# Patient Record
Sex: FEMALE | Race: BLACK OR AFRICAN AMERICAN
[De-identification: names, ages, dates, MRNs, and addresses within clinical notes are randomized per-mention and may not be internally consistent; named-entity substitution may affect disease eponyms.]

---

## 2017-03-29 ENCOUNTER — HOSPITAL ENCOUNTER (OUTPATIENT)
Dept: HOSPITAL 19 - COL.PUL | Age: 72
End: 2017-03-29
Attending: INTERNAL MEDICINE
Payer: MEDICARE

## 2017-03-29 DIAGNOSIS — R05: Primary | ICD-10-CM

## 2017-05-22 ENCOUNTER — HOSPITAL ENCOUNTER (OUTPATIENT)
Dept: HOSPITAL 19 - SURG | Age: 72
Setting detail: OBSERVATION
LOS: 2 days | Discharge: HOME | End: 2017-05-24
Attending: UROLOGY | Admitting: UROLOGY
Payer: MEDICARE

## 2017-05-22 VITALS — TEMPERATURE: 98.3 F | HEART RATE: 62 BPM | SYSTOLIC BLOOD PRESSURE: 138 MMHG | DIASTOLIC BLOOD PRESSURE: 69 MMHG

## 2017-05-22 VITALS — HEART RATE: 64 BPM | TEMPERATURE: 97.9 F | SYSTOLIC BLOOD PRESSURE: 140 MMHG | DIASTOLIC BLOOD PRESSURE: 55 MMHG

## 2017-05-22 VITALS — HEIGHT: 64 IN | WEIGHT: 198.2 LBS | BODY MASS INDEX: 33.84 KG/M2

## 2017-05-22 VITALS — HEART RATE: 67 BPM | DIASTOLIC BLOOD PRESSURE: 58 MMHG | SYSTOLIC BLOOD PRESSURE: 146 MMHG | TEMPERATURE: 97.7 F

## 2017-05-22 DIAGNOSIS — E11.9: ICD-10-CM

## 2017-05-22 DIAGNOSIS — K21.9: ICD-10-CM

## 2017-05-22 DIAGNOSIS — I10: ICD-10-CM

## 2017-05-22 DIAGNOSIS — G47.33: ICD-10-CM

## 2017-05-22 DIAGNOSIS — I25.10: ICD-10-CM

## 2017-05-22 DIAGNOSIS — Z79.4: ICD-10-CM

## 2017-05-22 DIAGNOSIS — Z95.5: ICD-10-CM

## 2017-05-22 DIAGNOSIS — E78.00: ICD-10-CM

## 2017-05-22 DIAGNOSIS — N20.1: Primary | ICD-10-CM

## 2017-05-22 PROCEDURE — G0378 HOSPITAL OBSERVATION PER HR: HCPCS

## 2017-05-22 PROCEDURE — C1769 GUIDE WIRE: HCPCS

## 2017-05-22 PROCEDURE — G0379 DIRECT REFER HOSPITAL OBSERV: HCPCS

## 2017-05-22 PROCEDURE — C2617 STENT, NON-COR, TEM W/O DEL: HCPCS

## 2017-05-23 VITALS — HEART RATE: 60 BPM | DIASTOLIC BLOOD PRESSURE: 69 MMHG | SYSTOLIC BLOOD PRESSURE: 149 MMHG | TEMPERATURE: 97.9 F

## 2017-05-23 VITALS — HEART RATE: 65 BPM | DIASTOLIC BLOOD PRESSURE: 56 MMHG | SYSTOLIC BLOOD PRESSURE: 134 MMHG | TEMPERATURE: 98.3 F

## 2017-05-23 VITALS — HEART RATE: 57 BPM | SYSTOLIC BLOOD PRESSURE: 131 MMHG | TEMPERATURE: 98.5 F | DIASTOLIC BLOOD PRESSURE: 56 MMHG

## 2017-05-23 VITALS — HEART RATE: 64 BPM | DIASTOLIC BLOOD PRESSURE: 72 MMHG | SYSTOLIC BLOOD PRESSURE: 150 MMHG

## 2017-05-23 VITALS — DIASTOLIC BLOOD PRESSURE: 63 MMHG | HEART RATE: 60 BPM | TEMPERATURE: 98.3 F | SYSTOLIC BLOOD PRESSURE: 152 MMHG

## 2017-05-23 VITALS — SYSTOLIC BLOOD PRESSURE: 136 MMHG | HEART RATE: 65 BPM | DIASTOLIC BLOOD PRESSURE: 76 MMHG | TEMPERATURE: 97.8 F

## 2017-05-23 VITALS — DIASTOLIC BLOOD PRESSURE: 61 MMHG | TEMPERATURE: 98.1 F | HEART RATE: 56 BPM | SYSTOLIC BLOOD PRESSURE: 149 MMHG

## 2017-05-23 VITALS — DIASTOLIC BLOOD PRESSURE: 58 MMHG | SYSTOLIC BLOOD PRESSURE: 158 MMHG | HEART RATE: 66 BPM | TEMPERATURE: 98.2 F

## 2017-05-23 VITALS — DIASTOLIC BLOOD PRESSURE: 78 MMHG | SYSTOLIC BLOOD PRESSURE: 142 MMHG | HEART RATE: 72 BPM

## 2017-05-23 VITALS — DIASTOLIC BLOOD PRESSURE: 84 MMHG | SYSTOLIC BLOOD PRESSURE: 140 MMHG | TEMPERATURE: 98 F | HEART RATE: 70 BPM

## 2017-05-23 VITALS — TEMPERATURE: 98.1 F | SYSTOLIC BLOOD PRESSURE: 149 MMHG | DIASTOLIC BLOOD PRESSURE: 61 MMHG | HEART RATE: 56 BPM

## 2017-05-23 VITALS — HEART RATE: 56 BPM | DIASTOLIC BLOOD PRESSURE: 82 MMHG | SYSTOLIC BLOOD PRESSURE: 156 MMHG

## 2017-05-24 VITALS — DIASTOLIC BLOOD PRESSURE: 54 MMHG | SYSTOLIC BLOOD PRESSURE: 116 MMHG | HEART RATE: 60 BPM | TEMPERATURE: 98.5 F

## 2017-05-24 VITALS — SYSTOLIC BLOOD PRESSURE: 109 MMHG | TEMPERATURE: 98.5 F | HEART RATE: 55 BPM | DIASTOLIC BLOOD PRESSURE: 52 MMHG

## 2017-05-29 ENCOUNTER — HOSPITAL ENCOUNTER (EMERGENCY)
Dept: HOSPITAL 19 - COL.ER | Age: 72
Discharge: HOME | End: 2017-05-29
Payer: MEDICARE

## 2017-05-29 VITALS — BODY MASS INDEX: 34.06 KG/M2 | WEIGHT: 199.52 LBS | HEIGHT: 64.02 IN

## 2017-05-29 VITALS — SYSTOLIC BLOOD PRESSURE: 134 MMHG | DIASTOLIC BLOOD PRESSURE: 78 MMHG | HEART RATE: 63 BPM

## 2017-05-29 VITALS — TEMPERATURE: 98.6 F

## 2017-05-29 DIAGNOSIS — Z95.5: ICD-10-CM

## 2017-05-29 DIAGNOSIS — M19.90: ICD-10-CM

## 2017-05-29 DIAGNOSIS — Z79.84: ICD-10-CM

## 2017-05-29 DIAGNOSIS — I25.10: ICD-10-CM

## 2017-05-29 DIAGNOSIS — I10: ICD-10-CM

## 2017-05-29 DIAGNOSIS — Z90.710: ICD-10-CM

## 2017-05-29 DIAGNOSIS — Z96.0: ICD-10-CM

## 2017-05-29 DIAGNOSIS — R07.89: Primary | ICD-10-CM

## 2017-05-29 DIAGNOSIS — E11.9: ICD-10-CM

## 2017-05-29 DIAGNOSIS — E78.00: ICD-10-CM

## 2017-05-29 LAB
ADJUSTED CALCIUM: 9.2 MG/DL (ref 8.4–10.2)
ALBUMIN SERPL-MCNC: 3.7 GM/DL (ref 3.5–5)
ALP SERPL-CCNC: 87 U/L (ref 50–136)
ALT SERPL-CCNC: 21 U/L (ref 9–52)
ANION GAP SERPL CALC-SCNC: 14 MMOL/L (ref 7–16)
BASOPHILS # BLD: 0 10*3/UL (ref 0–0.2)
BASOPHILS NFR BLD AUTO: 0.3 % (ref 0–2)
BILIRUB SERPL-MCNC: 0.5 MG/DL (ref 0–1)
BNP SERPL-MCNC: 25 PG/ML (ref 0–125)
BUN SERPL-MCNC: 15 MG/DL (ref 7–17)
CALCIUM SERPL-MCNC: 9 MG/DL (ref 8.4–10.2)
CHLORIDE SERPL-SCNC: 104 MMOL/L (ref 98–107)
CO2 SERPL-SCNC: 27 MMOL/L (ref 22–30)
CREAT SERPL-SCNC: 0.79 MG/DL (ref 0.52–1.25)
EOSINOPHIL # BLD: 0.4 10*3/UL (ref 0–0.7)
EOSINOPHIL NFR BLD: 6 % (ref 0–4)
GLUCOSE SERPL-MCNC: 160 MG/DL (ref 74–106)
GRANULOCYTES # BLD AUTO: 58 % (ref 42.2–75.2)
HCT VFR BLD AUTO: 37.5 % (ref 37–47)
HGB BLD-MCNC: 11.7 G/DL (ref 12.5–16)
LIPASE SERPL-CCNC: 65 U/L (ref 23–300)
LYMPHOCYTES # BLD: 1.8 10*3/UL (ref 1.2–3.4)
LYMPHOCYTES NFR BLD: 29.2 % (ref 20–51)
MCH RBC QN AUTO: 26 PG (ref 27–31)
MCHC RBC AUTO-ENTMCNC: 31 G/DL (ref 33–37)
MCV RBC AUTO: 84 FL (ref 80–100)
MONOCYTES # BLD: 0.4 10*3/UL (ref 0.1–0.6)
MONOCYTES NFR BLD AUTO: 6.2 % (ref 1.7–9.3)
NEUTROPHILS # BLD: 3.6 10*3/UL (ref 1.4–6.5)
PH UR STRIP.AUTO: 6 [PH] (ref 5–8)
PLATELET # BLD AUTO: 235 K/MM3 (ref 130–400)
PMV BLD AUTO: 11.5 FL (ref 7.4–10.4)
POTASSIUM SERPL-SCNC: 3.8 MMOL/L (ref 3.4–5)
PROT SERPL-MCNC: 6.8 GM/DL (ref 6.4–8.2)
RBC # BLD AUTO: 4.45 M/MM3 (ref 4.1–5.3)
RBC # UR STRIP.AUTO: (no result) /UL
RBC # UR: >50 /HPF
SODIUM SERPL-SCNC: 145 MMOL/L (ref 137–145)
SP GR UR STRIP.AUTO: 1.01 (ref 1–1.03)
SQUAMOUS # URNS: (no result) /HPF
TROPONIN I SERPL-MCNC: < 0.012 NG/ML (ref 0–0.03)
UA DIPSTICK PNL UR STRIP.AUTO: (no result)
URINE LEUKOCYTE ESTERASE: (no result)
URN COLLECT METHOD CLASS: (no result)
WBC # BLD AUTO: 6.1 K/MM3 (ref 4.8–10.8)

## 2018-01-23 ENCOUNTER — HOSPITAL ENCOUNTER (OUTPATIENT)
Dept: HOSPITAL 19 - MEDICAL | Age: 73
Setting detail: OBSERVATION
LOS: 1 days | Discharge: HOME | End: 2018-01-24
Attending: INTERNAL MEDICINE | Admitting: INTERNAL MEDICINE
Payer: MEDICARE

## 2018-01-23 VITALS — DIASTOLIC BLOOD PRESSURE: 45 MMHG | HEART RATE: 72 BPM | SYSTOLIC BLOOD PRESSURE: 131 MMHG

## 2018-01-23 VITALS — SYSTOLIC BLOOD PRESSURE: 145 MMHG | TEMPERATURE: 97.9 F | DIASTOLIC BLOOD PRESSURE: 59 MMHG | HEART RATE: 54 BPM

## 2018-01-23 VITALS — TEMPERATURE: 98.3 F | HEART RATE: 70 BPM | SYSTOLIC BLOOD PRESSURE: 123 MMHG | DIASTOLIC BLOOD PRESSURE: 44 MMHG

## 2018-01-23 VITALS — WEIGHT: 199.74 LBS | HEIGHT: 64.02 IN | BODY MASS INDEX: 34.1 KG/M2

## 2018-01-23 DIAGNOSIS — R42: Primary | ICD-10-CM

## 2018-01-23 DIAGNOSIS — R07.89: ICD-10-CM

## 2018-01-23 DIAGNOSIS — I25.10: ICD-10-CM

## 2018-01-23 DIAGNOSIS — Z95.5: ICD-10-CM

## 2018-01-23 DIAGNOSIS — Z79.84: ICD-10-CM

## 2018-01-23 DIAGNOSIS — Z83.3: ICD-10-CM

## 2018-01-23 DIAGNOSIS — I10: ICD-10-CM

## 2018-01-23 DIAGNOSIS — Z90.710: ICD-10-CM

## 2018-01-23 DIAGNOSIS — E78.5: ICD-10-CM

## 2018-01-23 DIAGNOSIS — E11.9: ICD-10-CM

## 2018-01-23 DIAGNOSIS — Z82.49: ICD-10-CM

## 2018-01-23 DIAGNOSIS — Z80.0: ICD-10-CM

## 2018-01-23 DIAGNOSIS — Z79.01: ICD-10-CM

## 2018-01-23 DIAGNOSIS — G47.33: ICD-10-CM

## 2018-01-23 PROCEDURE — G0379 DIRECT REFER HOSPITAL OBSERV: HCPCS

## 2018-01-23 PROCEDURE — G0378 HOSPITAL OBSERVATION PER HR: HCPCS

## 2018-01-24 VITALS — HEART RATE: 72 BPM | SYSTOLIC BLOOD PRESSURE: 147 MMHG | DIASTOLIC BLOOD PRESSURE: 40 MMHG

## 2018-01-24 VITALS — TEMPERATURE: 97.9 F | DIASTOLIC BLOOD PRESSURE: 58 MMHG | SYSTOLIC BLOOD PRESSURE: 129 MMHG | HEART RATE: 75 BPM

## 2018-01-24 VITALS — DIASTOLIC BLOOD PRESSURE: 73 MMHG | SYSTOLIC BLOOD PRESSURE: 141 MMHG | HEART RATE: 90 BPM | TEMPERATURE: 98.9 F

## 2018-01-24 VITALS — HEART RATE: 82 BPM | DIASTOLIC BLOOD PRESSURE: 84 MMHG | SYSTOLIC BLOOD PRESSURE: 150 MMHG

## 2018-01-24 VITALS — HEART RATE: 75 BPM | DIASTOLIC BLOOD PRESSURE: 69 MMHG | SYSTOLIC BLOOD PRESSURE: 148 MMHG | TEMPERATURE: 98.4 F

## 2018-01-24 LAB
ALBUMIN SERPL-MCNC: 3.6 GM/DL (ref 3.5–5)
ALP SERPL-CCNC: 97 U/L (ref 50–136)
ALT SERPL-CCNC: 35 U/L (ref 9–52)
ANION GAP SERPL CALC-SCNC: 8 MMOL/L (ref 7–16)
AST SERPL-CCNC: 27 U/L (ref 15–37)
BASOPHILS # BLD: 0 10*3/UL (ref 0–0.2)
BASOPHILS NFR BLD AUTO: 0.3 % (ref 0–2)
BILIRUB SERPL-MCNC: 0.3 MG/DL (ref 0–1)
BUN SERPL-MCNC: 15 MG/DL (ref 7–17)
CALCIUM SERPL-MCNC: 8.9 MG/DL (ref 8.4–10.2)
CHLORIDE SERPL-SCNC: 106 MMOL/L (ref 98–107)
CO2 SERPL-SCNC: 27 MMOL/L (ref 22–30)
CREAT SERPL-SCNC: 0.81 MG/DL (ref 0.52–1.25)
EOSINOPHIL # BLD: 0.3 10*3/UL (ref 0–0.7)
EOSINOPHIL NFR BLD: 4.5 % (ref 0–4)
ERYTHROCYTE [DISTWIDTH] IN BLOOD BY AUTOMATED COUNT: 13 % (ref 11.5–14.5)
GLUCOSE SERPL-MCNC: 270 MG/DL (ref 74–106)
GRANULOCYTES # BLD AUTO: 47.2 % (ref 42.2–75.2)
HCT VFR BLD AUTO: 38.3 % (ref 37–47)
HGB BLD-MCNC: 11.8 G/DL (ref 12.5–16)
LYMPHOCYTES # BLD: 2.4 10*3/UL (ref 1.2–3.4)
LYMPHOCYTES NFR BLD: 40.4 % (ref 20–51)
MCH RBC QN AUTO: 26 PG (ref 27–31)
MCHC RBC AUTO-ENTMCNC: 31 G/DL (ref 33–37)
MCV RBC AUTO: 84 FL (ref 80–100)
MONOCYTES # BLD: 0.4 10*3/UL (ref 0.1–0.6)
MONOCYTES NFR BLD AUTO: 7.4 % (ref 1.7–9.3)
NEUTROPHILS # BLD: 2.8 10*3/UL (ref 1.4–6.5)
PLATELET # BLD AUTO: 186 K/MM3 (ref 130–400)
PMV BLD AUTO: 12.7 FL (ref 7.4–10.4)
POTASSIUM SERPL-SCNC: 3.7 MMOL/L (ref 3.4–5)
PROT SERPL-MCNC: 6.5 GM/DL (ref 6.4–8.2)
RBC # BLD AUTO: 4.55 M/MM3 (ref 4.1–5.3)
SODIUM SERPL-SCNC: 140 MMOL/L (ref 137–145)

## 2018-06-12 ENCOUNTER — HOSPITAL ENCOUNTER (INPATIENT)
Dept: HOSPITAL 19 - MEDICAL | Age: 73
LOS: 2 days | Discharge: HOME | DRG: 247 | End: 2018-06-14
Attending: FAMILY MEDICINE | Admitting: FAMILY MEDICINE
Payer: MEDICARE

## 2018-06-12 VITALS — HEART RATE: 59 BPM | SYSTOLIC BLOOD PRESSURE: 152 MMHG | DIASTOLIC BLOOD PRESSURE: 59 MMHG | TEMPERATURE: 98.7 F

## 2018-06-12 VITALS — TEMPERATURE: 97.2 F | HEART RATE: 55 BPM | DIASTOLIC BLOOD PRESSURE: 73 MMHG | SYSTOLIC BLOOD PRESSURE: 154 MMHG

## 2018-06-12 VITALS — HEIGHT: 64.02 IN | WEIGHT: 184.75 LBS | BODY MASS INDEX: 31.54 KG/M2

## 2018-06-12 VITALS — SYSTOLIC BLOOD PRESSURE: 144 MMHG | TEMPERATURE: 98.9 F | DIASTOLIC BLOOD PRESSURE: 51 MMHG | HEART RATE: 48 BPM

## 2018-06-12 VITALS — DIASTOLIC BLOOD PRESSURE: 45 MMHG | TEMPERATURE: 97.6 F | SYSTOLIC BLOOD PRESSURE: 138 MMHG | HEART RATE: 50 BPM

## 2018-06-12 VITALS — DIASTOLIC BLOOD PRESSURE: 53 MMHG | HEART RATE: 51 BPM | SYSTOLIC BLOOD PRESSURE: 129 MMHG | TEMPERATURE: 97.2 F

## 2018-06-12 VITALS — HEART RATE: 49 BPM | DIASTOLIC BLOOD PRESSURE: 56 MMHG | TEMPERATURE: 98.8 F | SYSTOLIC BLOOD PRESSURE: 138 MMHG

## 2018-06-12 VITALS — DIASTOLIC BLOOD PRESSURE: 73 MMHG | SYSTOLIC BLOOD PRESSURE: 154 MMHG | HEART RATE: 55 BPM | TEMPERATURE: 97.2 F

## 2018-06-12 DIAGNOSIS — Z79.4: ICD-10-CM

## 2018-06-12 DIAGNOSIS — E66.9: ICD-10-CM

## 2018-06-12 DIAGNOSIS — E11.9: ICD-10-CM

## 2018-06-12 DIAGNOSIS — E78.5: ICD-10-CM

## 2018-06-12 DIAGNOSIS — R00.1: ICD-10-CM

## 2018-06-12 DIAGNOSIS — I10: ICD-10-CM

## 2018-06-12 DIAGNOSIS — I25.110: Primary | ICD-10-CM

## 2018-06-12 DIAGNOSIS — Z79.84: ICD-10-CM

## 2018-06-12 DIAGNOSIS — G47.33: ICD-10-CM

## 2018-06-12 LAB
CHOLEST SPEC-SCNC: 169 MG/DL (ref 120–200)
CHOLEST/HDLC SERPL-SRTO: 4.3
HDLC SERPL-MCNC: 39 MG/DL
LDLC SERPL-MCNC: 118 MG/DL
MAGNESIUM SERPL-MCNC: 2.1 MG/DL (ref 1.6–2.3)
TRIGL SERPL-MCNC: 60 MG/DL
TROPONIN I SERPL-MCNC: < 0.012 NG/ML (ref 0–0.03)

## 2018-06-12 PROCEDURE — C1874 STENT, COATED/COV W/DEL SYS: HCPCS

## 2018-06-12 PROCEDURE — C1887 CATHETER, GUIDING: HCPCS

## 2018-06-12 PROCEDURE — C1769 GUIDE WIRE: HCPCS

## 2018-06-12 PROCEDURE — C9607 PERC D-E COR REVASC CHRO SIN: HCPCS

## 2018-06-12 PROCEDURE — G0378 HOSPITAL OBSERVATION PER HR: HCPCS

## 2018-06-12 PROCEDURE — C1725 CATH, TRANSLUMIN NON-LASER: HCPCS

## 2018-06-12 PROCEDURE — G0379 DIRECT REFER HOSPITAL OBSERV: HCPCS

## 2018-06-13 VITALS — OXYGEN SATURATION: 99 %

## 2018-06-13 VITALS — HEART RATE: 56 BPM | OXYGEN SATURATION: 100 % | DIASTOLIC BLOOD PRESSURE: 93 MMHG | SYSTOLIC BLOOD PRESSURE: 155 MMHG

## 2018-06-13 VITALS — OXYGEN SATURATION: 100 %

## 2018-06-13 VITALS — OXYGEN SATURATION: 97 %

## 2018-06-13 VITALS — OXYGEN SATURATION: 98 %

## 2018-06-13 VITALS — SYSTOLIC BLOOD PRESSURE: 106 MMHG | DIASTOLIC BLOOD PRESSURE: 52 MMHG | HEART RATE: 60 BPM | OXYGEN SATURATION: 92 %

## 2018-06-13 VITALS — DIASTOLIC BLOOD PRESSURE: 70 MMHG | HEART RATE: 65 BPM | SYSTOLIC BLOOD PRESSURE: 114 MMHG | TEMPERATURE: 97.8 F

## 2018-06-13 VITALS — SYSTOLIC BLOOD PRESSURE: 146 MMHG | OXYGEN SATURATION: 99 % | DIASTOLIC BLOOD PRESSURE: 83 MMHG | HEART RATE: 57 BPM

## 2018-06-13 VITALS — HEART RATE: 65 BPM | SYSTOLIC BLOOD PRESSURE: 129 MMHG | DIASTOLIC BLOOD PRESSURE: 69 MMHG

## 2018-06-13 VITALS — SYSTOLIC BLOOD PRESSURE: 139 MMHG | DIASTOLIC BLOOD PRESSURE: 95 MMHG | HEART RATE: 62 BPM

## 2018-06-13 VITALS — SYSTOLIC BLOOD PRESSURE: 151 MMHG | DIASTOLIC BLOOD PRESSURE: 92 MMHG | HEART RATE: 56 BPM

## 2018-06-13 VITALS
TEMPERATURE: 97.8 F | OXYGEN SATURATION: 99 % | HEART RATE: 57 BPM | SYSTOLIC BLOOD PRESSURE: 140 MMHG | DIASTOLIC BLOOD PRESSURE: 76 MMHG

## 2018-06-13 VITALS — DIASTOLIC BLOOD PRESSURE: 68 MMHG | TEMPERATURE: 98.2 F | SYSTOLIC BLOOD PRESSURE: 130 MMHG | HEART RATE: 73 BPM

## 2018-06-13 VITALS — DIASTOLIC BLOOD PRESSURE: 96 MMHG | SYSTOLIC BLOOD PRESSURE: 135 MMHG | HEART RATE: 65 BPM | OXYGEN SATURATION: 93 %

## 2018-06-13 VITALS — OXYGEN SATURATION: 92 %

## 2018-06-13 VITALS — DIASTOLIC BLOOD PRESSURE: 75 MMHG | SYSTOLIC BLOOD PRESSURE: 141 MMHG | HEART RATE: 65 BPM

## 2018-06-13 VITALS — SYSTOLIC BLOOD PRESSURE: 151 MMHG | HEART RATE: 56 BPM | DIASTOLIC BLOOD PRESSURE: 92 MMHG

## 2018-06-13 VITALS — DIASTOLIC BLOOD PRESSURE: 87 MMHG | HEART RATE: 72 BPM | SYSTOLIC BLOOD PRESSURE: 137 MMHG

## 2018-06-13 VITALS — OXYGEN SATURATION: 77 %

## 2018-06-13 VITALS — HEART RATE: 53 BPM | SYSTOLIC BLOOD PRESSURE: 142 MMHG | DIASTOLIC BLOOD PRESSURE: 60 MMHG

## 2018-06-13 VITALS — SYSTOLIC BLOOD PRESSURE: 137 MMHG | DIASTOLIC BLOOD PRESSURE: 88 MMHG | HEART RATE: 70 BPM

## 2018-06-13 VITALS — DIASTOLIC BLOOD PRESSURE: 88 MMHG | SYSTOLIC BLOOD PRESSURE: 150 MMHG | HEART RATE: 65 BPM

## 2018-06-13 VITALS — OXYGEN SATURATION: 93 %

## 2018-06-13 VITALS — OXYGEN SATURATION: 96 %

## 2018-06-13 VITALS — OXYGEN SATURATION: 94 %

## 2018-06-13 VITALS — SYSTOLIC BLOOD PRESSURE: 109 MMHG | DIASTOLIC BLOOD PRESSURE: 52 MMHG

## 2018-06-13 VITALS — OXYGEN SATURATION: 95 %

## 2018-06-13 VITALS — OXYGEN SATURATION: 91 %

## 2018-06-13 LAB
ANION GAP SERPL CALC-SCNC: 11 MMOL/L (ref 7–16)
BASOPHILS # BLD: 0 10*3/UL (ref 0–0.2)
BASOPHILS NFR BLD AUTO: 0.2 % (ref 0–2)
BUN SERPL-MCNC: 12 MG/DL (ref 7–17)
CALCIUM SERPL-MCNC: 9 MG/DL (ref 8.4–10.2)
CHLORIDE SERPL-SCNC: 105 MMOL/L (ref 98–107)
CHOLEST SPEC-SCNC: 178 MG/DL (ref 120–200)
CHOLEST/HDLC SERPL-SRTO: 4.6
CO2 SERPL-SCNC: 24 MMOL/L (ref 22–30)
CREAT SERPL-SCNC: 0.78 MG/DL (ref 0.52–1.25)
EOSINOPHIL # BLD: 0.3 10*3/UL (ref 0–0.7)
EOSINOPHIL NFR BLD: 6.7 % (ref 0–4)
ERYTHROCYTE [DISTWIDTH] IN BLOOD BY AUTOMATED COUNT: 13.7 % (ref 11.5–14.5)
GLUCOSE SERPL-MCNC: 112 MG/DL (ref 74–106)
GRANULOCYTES # BLD AUTO: 53.8 % (ref 42.2–75.2)
HCT VFR BLD AUTO: 40.8 % (ref 37–47)
HDLC SERPL-MCNC: 38 MG/DL
HGB BLD-MCNC: 12.7 G/DL (ref 12.5–16)
LDLC SERPL-MCNC: 125 MG/DL
LYMPHOCYTES # BLD: 1.6 10*3/UL (ref 1.2–3.4)
LYMPHOCYTES NFR BLD: 32.6 % (ref 20–51)
MCH RBC QN AUTO: 26 PG (ref 27–31)
MCHC RBC AUTO-ENTMCNC: 31 G/DL (ref 33–37)
MCV RBC AUTO: 84 FL (ref 80–100)
MONOCYTES # BLD: 0.3 10*3/UL (ref 0.1–0.6)
MONOCYTES NFR BLD AUTO: 6.7 % (ref 1.7–9.3)
NEUTROPHILS # BLD: 2.6 10*3/UL (ref 1.4–6.5)
PLATELET # BLD AUTO: 214 K/MM3 (ref 130–400)
PMV BLD AUTO: 12.1 FL (ref 7.4–10.4)
POTASSIUM SERPL-SCNC: 3.9 MMOL/L (ref 3.4–5)
RBC # BLD AUTO: 4.86 M/MM3 (ref 4.1–5.3)
SODIUM SERPL-SCNC: 140 MMOL/L (ref 137–145)
TRIGL SERPL-MCNC: 74 MG/DL
TROPONIN I SERPL-MCNC: < 0.012 NG/ML (ref 0–0.03)

## 2018-06-13 PROCEDURE — 4A023N7 MEASUREMENT OF CARDIAC SAMPLING AND PRESSURE, LEFT HEART, PERCUTANEOUS APPROACH: ICD-10-PCS | Performed by: INTERNAL MEDICINE

## 2018-06-13 PROCEDURE — B2111ZZ FLUOROSCOPY OF MULTIPLE CORONARY ARTERIES USING LOW OSMOLAR CONTRAST: ICD-10-PCS | Performed by: INTERNAL MEDICINE

## 2018-06-13 PROCEDURE — 027034Z DILATION OF CORONARY ARTERY, ONE ARTERY WITH DRUG-ELUTING INTRALUMINAL DEVICE, PERCUTANEOUS APPROACH: ICD-10-PCS | Performed by: INTERNAL MEDICINE

## 2018-06-13 PROCEDURE — B2151ZZ FLUOROSCOPY OF LEFT HEART USING LOW OSMOLAR CONTRAST: ICD-10-PCS | Performed by: INTERNAL MEDICINE

## 2018-06-14 VITALS — OXYGEN SATURATION: 100 %

## 2018-06-14 VITALS — OXYGEN SATURATION: 99 %

## 2018-06-14 VITALS
TEMPERATURE: 97.8 F | HEART RATE: 52 BPM | OXYGEN SATURATION: 98 % | SYSTOLIC BLOOD PRESSURE: 130 MMHG | DIASTOLIC BLOOD PRESSURE: 67 MMHG

## 2018-06-14 VITALS — OXYGEN SATURATION: 98 %

## 2018-06-14 VITALS — OXYGEN SATURATION: 96 %

## 2018-06-14 VITALS — OXYGEN SATURATION: 92 %

## 2018-06-14 VITALS — OXYGEN SATURATION: 97 %

## 2018-06-14 VITALS — TEMPERATURE: 97.4 F | HEART RATE: 59 BPM | SYSTOLIC BLOOD PRESSURE: 126 MMHG | DIASTOLIC BLOOD PRESSURE: 77 MMHG

## 2018-06-14 VITALS — HEART RATE: 62 BPM | DIASTOLIC BLOOD PRESSURE: 89 MMHG | SYSTOLIC BLOOD PRESSURE: 130 MMHG | TEMPERATURE: 98 F

## 2018-06-14 VITALS — OXYGEN SATURATION: 95 %

## 2018-06-14 VITALS — OXYGEN SATURATION: 88 %

## 2018-06-14 VITALS — OXYGEN SATURATION: 94 %

## 2018-06-14 LAB
ANION GAP SERPL CALC-SCNC: 8 MMOL/L (ref 7–16)
BASOPHILS # BLD: 0 10*3/UL (ref 0–0.2)
BASOPHILS NFR BLD AUTO: 0.4 % (ref 0–2)
BUN SERPL-MCNC: 12 MG/DL (ref 7–17)
CALCIUM SERPL-MCNC: 8.7 MG/DL (ref 8.4–10.2)
CHLORIDE SERPL-SCNC: 104 MMOL/L (ref 98–107)
CO2 SERPL-SCNC: 28 MMOL/L (ref 22–30)
CREAT SERPL-SCNC: 0.73 MG/DL (ref 0.52–1.25)
EOSINOPHIL # BLD: 0.3 10*3/UL (ref 0–0.7)
EOSINOPHIL NFR BLD: 5.2 % (ref 0–4)
ERYTHROCYTE [DISTWIDTH] IN BLOOD BY AUTOMATED COUNT: 13.6 % (ref 11.5–14.5)
GLUCOSE SERPL-MCNC: 115 MG/DL (ref 74–106)
GRANULOCYTES # BLD AUTO: 55.1 % (ref 42.2–75.2)
HCT VFR BLD AUTO: 39.5 % (ref 37–47)
HGB BLD-MCNC: 12.4 G/DL (ref 12.5–16)
LYMPHOCYTES # BLD: 1.7 10*3/UL (ref 1.2–3.4)
LYMPHOCYTES NFR BLD: 30.8 % (ref 20–51)
MCH RBC QN AUTO: 26 PG (ref 27–31)
MCHC RBC AUTO-ENTMCNC: 31 G/DL (ref 33–37)
MCV RBC AUTO: 83 FL (ref 80–100)
MONOCYTES # BLD: 0.5 10*3/UL (ref 0.1–0.6)
MONOCYTES NFR BLD AUTO: 8.3 % (ref 1.7–9.3)
NEUTROPHILS # BLD: 3 10*3/UL (ref 1.4–6.5)
PLATELET # BLD AUTO: 196 K/MM3 (ref 130–400)
PMV BLD AUTO: 11.9 FL (ref 7.4–10.4)
POTASSIUM SERPL-SCNC: 3.8 MMOL/L (ref 3.4–5)
RBC # BLD AUTO: 4.74 M/MM3 (ref 4.1–5.3)
SODIUM SERPL-SCNC: 140 MMOL/L (ref 137–145)

## 2018-06-18 ENCOUNTER — HOSPITAL ENCOUNTER (INPATIENT)
Dept: HOSPITAL 19 - COL.ER | Age: 73
LOS: 3 days | Discharge: HOME | DRG: 313 | End: 2018-06-21
Attending: INTERNAL MEDICINE | Admitting: INTERNAL MEDICINE
Payer: MEDICARE

## 2018-06-18 VITALS — OXYGEN SATURATION: 95 %

## 2018-06-18 VITALS — OXYGEN SATURATION: 96 %

## 2018-06-18 VITALS — HEART RATE: 52 BPM | TEMPERATURE: 97.5 F | DIASTOLIC BLOOD PRESSURE: 79 MMHG | SYSTOLIC BLOOD PRESSURE: 148 MMHG

## 2018-06-18 VITALS — OXYGEN SATURATION: 94 %

## 2018-06-18 VITALS — OXYGEN SATURATION: 98 %

## 2018-06-18 VITALS — HEIGHT: 64 IN | BODY MASS INDEX: 31.2 KG/M2 | WEIGHT: 182.76 LBS

## 2018-06-18 VITALS — OXYGEN SATURATION: 97 %

## 2018-06-18 VITALS — OXYGEN SATURATION: 99 %

## 2018-06-18 VITALS — OXYGEN SATURATION: 93 %

## 2018-06-18 VITALS — OXYGEN SATURATION: 92 %

## 2018-06-18 DIAGNOSIS — R20.2: ICD-10-CM

## 2018-06-18 DIAGNOSIS — I25.10: ICD-10-CM

## 2018-06-18 DIAGNOSIS — Z79.4: ICD-10-CM

## 2018-06-18 DIAGNOSIS — E11.9: ICD-10-CM

## 2018-06-18 DIAGNOSIS — G47.33: ICD-10-CM

## 2018-06-18 DIAGNOSIS — Z95.5: ICD-10-CM

## 2018-06-18 DIAGNOSIS — I10: ICD-10-CM

## 2018-06-18 DIAGNOSIS — R07.89: Primary | ICD-10-CM

## 2018-06-18 LAB
ALBUMIN SERPL-MCNC: 3.6 GM/DL (ref 3.5–5)
ALP SERPL-CCNC: 66 U/L (ref 50–136)
ALT SERPL-CCNC: 23 U/L (ref 9–52)
ANION GAP SERPL CALC-SCNC: 10 MMOL/L (ref 7–16)
APTT PPP: 36 SECONDS (ref 26–37)
AST SERPL-CCNC: 18 U/L (ref 15–37)
BASOPHILS # BLD: 0 10*3/UL (ref 0–0.2)
BASOPHILS NFR BLD AUTO: 0.3 % (ref 0–2)
BILIRUB SERPL-MCNC: 0.4 MG/DL (ref 0–1)
BUN SERPL-MCNC: 21 MG/DL (ref 7–17)
CALCIUM SERPL-MCNC: 9.3 MG/DL (ref 8.4–10.2)
CHLORIDE SERPL-SCNC: 105 MMOL/L (ref 98–107)
CO2 SERPL-SCNC: 27 MMOL/L (ref 22–30)
CREAT SERPL-SCNC: 0.98 MG/DL (ref 0.52–1.25)
CRP SERPL-MCNC: 0.9 MG/DL (ref 0–0.9)
EOSINOPHIL # BLD: 0.3 10*3/UL (ref 0–0.7)
EOSINOPHIL NFR BLD: 4.4 % (ref 0–4)
ERYTHROCYTE [DISTWIDTH] IN BLOOD BY AUTOMATED COUNT: 14 % (ref 11.5–14.5)
GLUCOSE SERPL-MCNC: 120 MG/DL (ref 74–106)
GRANULOCYTES # BLD AUTO: 58.5 % (ref 42.2–75.2)
HCT VFR BLD AUTO: 38 % (ref 37–47)
HGB BLD-MCNC: 12.1 G/DL (ref 12.5–16)
INR BLD: 1 (ref 0.8–3)
LIPASE SERPL-CCNC: 36 U/L (ref 23–300)
LYMPHOCYTES # BLD: 2 10*3/UL (ref 1.2–3.4)
LYMPHOCYTES NFR BLD: 28.7 % (ref 20–51)
MCH RBC QN AUTO: 26 PG (ref 27–31)
MCHC RBC AUTO-ENTMCNC: 32 G/DL (ref 33–37)
MCV RBC AUTO: 83 FL (ref 80–100)
MONOCYTES # BLD: 0.5 10*3/UL (ref 0.1–0.6)
MONOCYTES NFR BLD AUTO: 7.7 % (ref 1.7–9.3)
NEUTROPHILS # BLD: 4.1 10*3/UL (ref 1.4–6.5)
PLATELET # BLD AUTO: 207 K/MM3 (ref 130–400)
PMV BLD AUTO: 12.5 FL (ref 7.4–10.4)
POTASSIUM SERPL-SCNC: 3.8 MMOL/L (ref 3.4–5)
PROT SERPL-MCNC: 7.4 GM/DL (ref 6.4–8.2)
PROTHROMBIN TIME: 11.8 SECONDS (ref 9.7–12.8)
RBC # BLD AUTO: 4.58 M/MM3 (ref 4.1–5.3)
SODIUM SERPL-SCNC: 142 MMOL/L (ref 137–145)
TROPONIN I SERPL-MCNC: 0.05 NG/ML (ref 0–0.03)

## 2018-06-18 PROCEDURE — A9502 TC99M TETROFOSMIN: HCPCS

## 2018-06-18 PROCEDURE — A9585 GADOBUTROL INJECTION: HCPCS

## 2018-06-18 PROCEDURE — G0378 HOSPITAL OBSERVATION PER HR: HCPCS

## 2018-06-19 VITALS — OXYGEN SATURATION: 95 %

## 2018-06-19 VITALS — OXYGEN SATURATION: 96 %

## 2018-06-19 VITALS — OXYGEN SATURATION: 97 %

## 2018-06-19 VITALS — SYSTOLIC BLOOD PRESSURE: 103 MMHG | HEART RATE: 83 BPM | DIASTOLIC BLOOD PRESSURE: 52 MMHG

## 2018-06-19 VITALS — OXYGEN SATURATION: 94 %

## 2018-06-19 VITALS — SYSTOLIC BLOOD PRESSURE: 131 MMHG | DIASTOLIC BLOOD PRESSURE: 64 MMHG | HEART RATE: 51 BPM

## 2018-06-19 VITALS — OXYGEN SATURATION: 93 %

## 2018-06-19 VITALS — OXYGEN SATURATION: 91 %

## 2018-06-19 VITALS — DIASTOLIC BLOOD PRESSURE: 43 MMHG | HEART RATE: 82 BPM | SYSTOLIC BLOOD PRESSURE: 92 MMHG

## 2018-06-19 VITALS — SYSTOLIC BLOOD PRESSURE: 98 MMHG | DIASTOLIC BLOOD PRESSURE: 41 MMHG | HEART RATE: 78 BPM

## 2018-06-19 VITALS — OXYGEN SATURATION: 92 %

## 2018-06-19 VITALS — OXYGEN SATURATION: 99 %

## 2018-06-19 VITALS — OXYGEN SATURATION: 100 %

## 2018-06-19 VITALS
HEART RATE: 53 BPM | OXYGEN SATURATION: 98 % | SYSTOLIC BLOOD PRESSURE: 145 MMHG | TEMPERATURE: 98 F | DIASTOLIC BLOOD PRESSURE: 73 MMHG

## 2018-06-19 VITALS — OXYGEN SATURATION: 98 %

## 2018-06-19 VITALS — OXYGEN SATURATION: 89 %

## 2018-06-19 VITALS — HEART RATE: 56 BPM | SYSTOLIC BLOOD PRESSURE: 111 MMHG | DIASTOLIC BLOOD PRESSURE: 56 MMHG

## 2018-06-19 VITALS — DIASTOLIC BLOOD PRESSURE: 32 MMHG | HEART RATE: 91 BPM | SYSTOLIC BLOOD PRESSURE: 83 MMHG

## 2018-06-19 VITALS — TEMPERATURE: 97.8 F | DIASTOLIC BLOOD PRESSURE: 79 MMHG | SYSTOLIC BLOOD PRESSURE: 153 MMHG | HEART RATE: 47 BPM

## 2018-06-19 VITALS — TEMPERATURE: 97.6 F | SYSTOLIC BLOOD PRESSURE: 135 MMHG | HEART RATE: 48 BPM | DIASTOLIC BLOOD PRESSURE: 62 MMHG

## 2018-06-19 VITALS — SYSTOLIC BLOOD PRESSURE: 133 MMHG | DIASTOLIC BLOOD PRESSURE: 67 MMHG | HEART RATE: 61 BPM | TEMPERATURE: 97.5 F

## 2018-06-19 VITALS — OXYGEN SATURATION: 88 %

## 2018-06-19 VITALS — DIASTOLIC BLOOD PRESSURE: 64 MMHG | TEMPERATURE: 97.6 F | HEART RATE: 51 BPM | SYSTOLIC BLOOD PRESSURE: 131 MMHG

## 2018-06-19 VITALS — OXYGEN SATURATION: 90 %

## 2018-06-19 LAB
ANION GAP SERPL CALC-SCNC: 8 MMOL/L (ref 7–16)
BASOPHILS # BLD: 0 10*3/UL (ref 0–0.2)
BASOPHILS NFR BLD AUTO: 0.4 % (ref 0–2)
BUN SERPL-MCNC: 20 MG/DL (ref 7–17)
CALCIUM SERPL-MCNC: 9 MG/DL (ref 8.4–10.2)
CHLORIDE SERPL-SCNC: 106 MMOL/L (ref 98–107)
CO2 SERPL-SCNC: 29 MMOL/L (ref 22–30)
CREAT SERPL-SCNC: 0.9 MG/DL (ref 0.52–1.25)
EOSINOPHIL # BLD: 0.3 10*3/UL (ref 0–0.7)
EOSINOPHIL NFR BLD: 6 % (ref 0–4)
ERYTHROCYTE [DISTWIDTH] IN BLOOD BY AUTOMATED COUNT: 14 % (ref 11.5–14.5)
GLUCOSE SERPL-MCNC: 100 MG/DL (ref 74–106)
GRANULOCYTES # BLD AUTO: 48.2 % (ref 42.2–75.2)
HCT VFR BLD AUTO: 38 % (ref 37–47)
HGB BLD-MCNC: 11.9 G/DL (ref 12.5–16)
LYMPHOCYTES # BLD: 2.1 10*3/UL (ref 1.2–3.4)
LYMPHOCYTES NFR BLD: 36.2 % (ref 20–51)
MCH RBC QN AUTO: 26 PG (ref 27–31)
MCHC RBC AUTO-ENTMCNC: 31 G/DL (ref 33–37)
MCV RBC AUTO: 83 FL (ref 80–100)
MONOCYTES # BLD: 0.5 10*3/UL (ref 0.1–0.6)
MONOCYTES NFR BLD AUTO: 9 % (ref 1.7–9.3)
NEUTROPHILS # BLD: 2.7 10*3/UL (ref 1.4–6.5)
PLATELET # BLD AUTO: 195 K/MM3 (ref 130–400)
PMV BLD AUTO: 12.3 FL (ref 7.4–10.4)
POTASSIUM SERPL-SCNC: 3.8 MMOL/L (ref 3.4–5)
RBC # BLD AUTO: 4.56 M/MM3 (ref 4.1–5.3)
SODIUM SERPL-SCNC: 143 MMOL/L (ref 137–145)
TROPONIN I SERPL-MCNC: 0.05 NG/ML (ref 0–0.03)

## 2018-06-20 VITALS — SYSTOLIC BLOOD PRESSURE: 165 MMHG | DIASTOLIC BLOOD PRESSURE: 63 MMHG | HEART RATE: 70 BPM | TEMPERATURE: 98.8 F

## 2018-06-20 VITALS — DIASTOLIC BLOOD PRESSURE: 54 MMHG | SYSTOLIC BLOOD PRESSURE: 140 MMHG | TEMPERATURE: 98 F | HEART RATE: 68 BPM

## 2018-06-20 VITALS — SYSTOLIC BLOOD PRESSURE: 136 MMHG | TEMPERATURE: 98.4 F | DIASTOLIC BLOOD PRESSURE: 63 MMHG | HEART RATE: 50 BPM

## 2018-06-20 VITALS — SYSTOLIC BLOOD PRESSURE: 136 MMHG | TEMPERATURE: 98.6 F | HEART RATE: 54 BPM | DIASTOLIC BLOOD PRESSURE: 60 MMHG

## 2018-06-20 VITALS — TEMPERATURE: 98.8 F | DIASTOLIC BLOOD PRESSURE: 53 MMHG | HEART RATE: 56 BPM | SYSTOLIC BLOOD PRESSURE: 131 MMHG

## 2018-06-20 VITALS — TEMPERATURE: 98.7 F | SYSTOLIC BLOOD PRESSURE: 104 MMHG | HEART RATE: 62 BPM | DIASTOLIC BLOOD PRESSURE: 64 MMHG

## 2018-06-21 VITALS — DIASTOLIC BLOOD PRESSURE: 75 MMHG | TEMPERATURE: 97.6 F | SYSTOLIC BLOOD PRESSURE: 150 MMHG | HEART RATE: 66 BPM

## 2018-06-21 VITALS — TEMPERATURE: 97.5 F | DIASTOLIC BLOOD PRESSURE: 49 MMHG | HEART RATE: 60 BPM | SYSTOLIC BLOOD PRESSURE: 146 MMHG

## 2018-06-21 VITALS — DIASTOLIC BLOOD PRESSURE: 63 MMHG | SYSTOLIC BLOOD PRESSURE: 143 MMHG | TEMPERATURE: 98 F | HEART RATE: 55 BPM

## 2018-11-13 ENCOUNTER — HOSPITAL ENCOUNTER (INPATIENT)
Dept: HOSPITAL 19 - MEDICAL | Age: 73
LOS: 2 days | Discharge: HOME | DRG: 287 | End: 2018-11-15
Attending: INTERNAL MEDICINE | Admitting: INTERNAL MEDICINE
Payer: MEDICARE

## 2018-11-13 VITALS — WEIGHT: 201.06 LBS | BODY MASS INDEX: 34.33 KG/M2 | HEIGHT: 64.02 IN

## 2018-11-13 VITALS — HEART RATE: 65 BPM | SYSTOLIC BLOOD PRESSURE: 133 MMHG | DIASTOLIC BLOOD PRESSURE: 57 MMHG | TEMPERATURE: 98.2 F

## 2018-11-13 VITALS — HEART RATE: 67 BPM | DIASTOLIC BLOOD PRESSURE: 65 MMHG | SYSTOLIC BLOOD PRESSURE: 155 MMHG | TEMPERATURE: 97.6 F

## 2018-11-13 VITALS — SYSTOLIC BLOOD PRESSURE: 156 MMHG | DIASTOLIC BLOOD PRESSURE: 67 MMHG | TEMPERATURE: 97.4 F | HEART RATE: 65 BPM

## 2018-11-13 VITALS — TEMPERATURE: 97.7 F | SYSTOLIC BLOOD PRESSURE: 146 MMHG | DIASTOLIC BLOOD PRESSURE: 61 MMHG | HEART RATE: 57 BPM

## 2018-11-13 DIAGNOSIS — I25.10: ICD-10-CM

## 2018-11-13 DIAGNOSIS — R07.89: Primary | ICD-10-CM

## 2018-11-13 DIAGNOSIS — Z95.5: ICD-10-CM

## 2018-11-13 DIAGNOSIS — E78.5: ICD-10-CM

## 2018-11-13 DIAGNOSIS — Z23: ICD-10-CM

## 2018-11-13 DIAGNOSIS — Z79.4: ICD-10-CM

## 2018-11-13 DIAGNOSIS — I10: ICD-10-CM

## 2018-11-13 DIAGNOSIS — G47.33: ICD-10-CM

## 2018-11-13 DIAGNOSIS — E11.9: ICD-10-CM

## 2018-11-13 LAB — TROPONIN I SERPL-MCNC: < 0.012 NG/ML (ref 0–0.03)

## 2018-11-13 PROCEDURE — C1887 CATHETER, GUIDING: HCPCS

## 2018-11-13 PROCEDURE — C1769 GUIDE WIRE: HCPCS

## 2018-11-13 PROCEDURE — G0378 HOSPITAL OBSERVATION PER HR: HCPCS

## 2018-11-13 PROCEDURE — C9113 INJ PANTOPRAZOLE SODIUM, VIA: HCPCS

## 2018-11-14 VITALS — TEMPERATURE: 98 F | HEART RATE: 65 BPM | SYSTOLIC BLOOD PRESSURE: 149 MMHG | DIASTOLIC BLOOD PRESSURE: 72 MMHG

## 2018-11-14 VITALS — SYSTOLIC BLOOD PRESSURE: 124 MMHG | DIASTOLIC BLOOD PRESSURE: 64 MMHG | HEART RATE: 57 BPM

## 2018-11-14 VITALS — DIASTOLIC BLOOD PRESSURE: 53 MMHG | SYSTOLIC BLOOD PRESSURE: 148 MMHG | HEART RATE: 95 BPM

## 2018-11-14 VITALS — DIASTOLIC BLOOD PRESSURE: 61 MMHG | HEART RATE: 78 BPM | SYSTOLIC BLOOD PRESSURE: 137 MMHG

## 2018-11-14 VITALS — DIASTOLIC BLOOD PRESSURE: 58 MMHG | SYSTOLIC BLOOD PRESSURE: 128 MMHG | HEART RATE: 56 BPM

## 2018-11-14 VITALS — TEMPERATURE: 98.7 F | SYSTOLIC BLOOD PRESSURE: 165 MMHG | DIASTOLIC BLOOD PRESSURE: 74 MMHG | HEART RATE: 67 BPM

## 2018-11-14 VITALS — SYSTOLIC BLOOD PRESSURE: 153 MMHG | TEMPERATURE: 97.7 F | DIASTOLIC BLOOD PRESSURE: 72 MMHG | HEART RATE: 61 BPM

## 2018-11-14 VITALS — SYSTOLIC BLOOD PRESSURE: 165 MMHG | HEART RATE: 67 BPM | DIASTOLIC BLOOD PRESSURE: 74 MMHG | TEMPERATURE: 98.7 F

## 2018-11-14 VITALS — SYSTOLIC BLOOD PRESSURE: 131 MMHG | TEMPERATURE: 97.9 F | DIASTOLIC BLOOD PRESSURE: 69 MMHG | HEART RATE: 98 BPM

## 2018-11-14 VITALS — HEART RATE: 101 BPM | SYSTOLIC BLOOD PRESSURE: 128 MMHG | DIASTOLIC BLOOD PRESSURE: 58 MMHG

## 2018-11-14 VITALS — DIASTOLIC BLOOD PRESSURE: 63 MMHG | HEART RATE: 96 BPM | SYSTOLIC BLOOD PRESSURE: 143 MMHG

## 2018-11-14 VITALS — SYSTOLIC BLOOD PRESSURE: 136 MMHG | DIASTOLIC BLOOD PRESSURE: 55 MMHG

## 2018-11-14 VITALS — DIASTOLIC BLOOD PRESSURE: 73 MMHG | SYSTOLIC BLOOD PRESSURE: 152 MMHG

## 2018-11-14 VITALS — HEART RATE: 73 BPM | DIASTOLIC BLOOD PRESSURE: 90 MMHG | SYSTOLIC BLOOD PRESSURE: 116 MMHG

## 2018-11-14 VITALS — TEMPERATURE: 98.7 F | SYSTOLIC BLOOD PRESSURE: 165 MMHG | HEART RATE: 67 BPM | DIASTOLIC BLOOD PRESSURE: 74 MMHG

## 2018-11-14 VITALS — HEART RATE: 61 BPM | SYSTOLIC BLOOD PRESSURE: 143 MMHG | DIASTOLIC BLOOD PRESSURE: 63 MMHG

## 2018-11-14 VITALS — HEART RATE: 95 BPM | DIASTOLIC BLOOD PRESSURE: 90 MMHG | SYSTOLIC BLOOD PRESSURE: 116 MMHG

## 2018-11-14 VITALS — HEART RATE: 59 BPM | DIASTOLIC BLOOD PRESSURE: 59 MMHG | SYSTOLIC BLOOD PRESSURE: 136 MMHG

## 2018-11-14 VITALS — SYSTOLIC BLOOD PRESSURE: 126 MMHG | DIASTOLIC BLOOD PRESSURE: 63 MMHG | HEART RATE: 50 BPM

## 2018-11-14 LAB
ALBUMIN SERPL-MCNC: 3.1 GM/DL (ref 3.5–5)
ALP SERPL-CCNC: 59 U/L (ref 50–136)
ALT SERPL-CCNC: 27 U/L (ref 9–52)
ANION GAP SERPL CALC-SCNC: 3 MMOL/L (ref 7–16)
AST SERPL-CCNC: 23 U/L (ref 15–37)
BASOPHILS # BLD: 0 10*3/UL (ref 0–0.2)
BASOPHILS NFR BLD AUTO: 0.2 % (ref 0–2)
BILIRUB SERPL-MCNC: 0.2 MG/DL (ref 0–1)
BUN SERPL-MCNC: 15 MG/DL (ref 7–17)
CALCIUM SERPL-MCNC: 8.3 MG/DL (ref 8.4–10.2)
CHLORIDE SERPL-SCNC: 112 MMOL/L (ref 98–107)
CHOLEST SPEC-SCNC: 131 MG/DL (ref 120–200)
CHOLEST/HDLC SERPL-SRTO: 3.1
CO2 SERPL-SCNC: 29 MMOL/L (ref 22–30)
CREAT SERPL-SCNC: 0.64 MG/DL (ref 0.52–1.25)
EOSINOPHIL # BLD: 0.2 10*3/UL (ref 0–0.7)
EOSINOPHIL NFR BLD: 4.4 % (ref 0–4)
ERYTHROCYTE [DISTWIDTH] IN BLOOD BY AUTOMATED COUNT: 14.8 % (ref 11.5–14.5)
GLUCOSE SERPL-MCNC: 105 MG/DL (ref 74–106)
GRANULOCYTES # BLD AUTO: 43.5 % (ref 42.2–75.2)
HCT VFR BLD AUTO: 36.1 % (ref 37–47)
HDLC SERPL-MCNC: 41 MG/DL
HGB BLD-MCNC: 11 G/DL (ref 12.5–16)
LDLC SERPL-MCNC: 78 MG/DL
LYMPHOCYTES # BLD: 2.1 10*3/UL (ref 1.2–3.4)
LYMPHOCYTES NFR BLD: 42.9 % (ref 20–51)
MCH RBC QN AUTO: 26 PG (ref 27–31)
MCHC RBC AUTO-ENTMCNC: 31 G/DL (ref 33–37)
MCV RBC AUTO: 84 FL (ref 80–100)
MONOCYTES # BLD: 0.4 10*3/UL (ref 0.1–0.6)
MONOCYTES NFR BLD AUTO: 8.8 % (ref 1.7–9.3)
NEUTROPHILS # BLD: 2.1 10*3/UL (ref 1.4–6.5)
PLATELET # BLD AUTO: 177 K/MM3 (ref 130–400)
PMV BLD AUTO: 12.1 FL (ref 7.4–10.4)
POTASSIUM SERPL-SCNC: 4 MMOL/L (ref 3.4–5)
PROT SERPL-MCNC: 6 GM/DL (ref 6.4–8.2)
RBC # BLD AUTO: 4.28 M/MM3 (ref 4.1–5.3)
SODIUM SERPL-SCNC: 144 MMOL/L (ref 137–145)
TRIGL SERPL-MCNC: 60 MG/DL
TROPONIN I SERPL-MCNC: < 0.012 NG/ML (ref 0–0.03)

## 2018-11-14 PROCEDURE — B2151ZZ FLUOROSCOPY OF LEFT HEART USING LOW OSMOLAR CONTRAST: ICD-10-PCS | Performed by: INTERNAL MEDICINE

## 2018-11-14 PROCEDURE — B31H1ZZ FLUOROSCOPY OF RIGHT UPPER EXTREMITY ARTERIES USING LOW OSMOLAR CONTRAST: ICD-10-PCS | Performed by: INTERNAL MEDICINE

## 2018-11-14 PROCEDURE — 4A023N7 MEASUREMENT OF CARDIAC SAMPLING AND PRESSURE, LEFT HEART, PERCUTANEOUS APPROACH: ICD-10-PCS | Performed by: INTERNAL MEDICINE

## 2018-11-14 PROCEDURE — B2111ZZ FLUOROSCOPY OF MULTIPLE CORONARY ARTERIES USING LOW OSMOLAR CONTRAST: ICD-10-PCS | Performed by: INTERNAL MEDICINE

## 2018-11-15 VITALS — TEMPERATURE: 98.7 F | DIASTOLIC BLOOD PRESSURE: 74 MMHG | SYSTOLIC BLOOD PRESSURE: 179 MMHG | HEART RATE: 63 BPM

## 2018-11-15 VITALS — TEMPERATURE: 98.6 F | DIASTOLIC BLOOD PRESSURE: 55 MMHG | SYSTOLIC BLOOD PRESSURE: 120 MMHG | HEART RATE: 68 BPM

## 2018-11-15 VITALS — HEART RATE: 77 BPM | SYSTOLIC BLOOD PRESSURE: 145 MMHG | DIASTOLIC BLOOD PRESSURE: 63 MMHG

## 2018-11-15 LAB
ANION GAP SERPL CALC-SCNC: 1 MMOL/L (ref 7–16)
BASOPHILS # BLD: 0 10*3/UL (ref 0–0.2)
BASOPHILS NFR BLD AUTO: 0.4 % (ref 0–2)
BUN SERPL-MCNC: 11 MG/DL (ref 7–17)
CALCIUM SERPL-MCNC: 8.6 MG/DL (ref 8.4–10.2)
CHLORIDE SERPL-SCNC: 109 MMOL/L (ref 98–107)
CO2 SERPL-SCNC: 31 MMOL/L (ref 22–30)
CREAT SERPL-SCNC: 0.64 MG/DL (ref 0.52–1.25)
EOSINOPHIL # BLD: 0.3 10*3/UL (ref 0–0.7)
EOSINOPHIL NFR BLD: 4.7 % (ref 0–4)
ERYTHROCYTE [DISTWIDTH] IN BLOOD BY AUTOMATED COUNT: 14.7 % (ref 11.5–14.5)
GLUCOSE SERPL-MCNC: 104 MG/DL (ref 74–106)
GRANULOCYTES # BLD AUTO: 57.3 % (ref 42.2–75.2)
HCT VFR BLD AUTO: 36.9 % (ref 37–47)
HGB BLD-MCNC: 11.7 G/DL (ref 12.5–16)
LYMPHOCYTES # BLD: 1.7 10*3/UL (ref 1.2–3.4)
LYMPHOCYTES NFR BLD: 30.4 % (ref 20–51)
MCH RBC QN AUTO: 26 PG (ref 27–31)
MCHC RBC AUTO-ENTMCNC: 32 G/DL (ref 33–37)
MCV RBC AUTO: 83 FL (ref 80–100)
MONOCYTES # BLD: 0.4 10*3/UL (ref 0.1–0.6)
MONOCYTES NFR BLD AUTO: 7 % (ref 1.7–9.3)
NEUTROPHILS # BLD: 3.3 10*3/UL (ref 1.4–6.5)
PLATELET # BLD AUTO: 193 K/MM3 (ref 130–400)
PMV BLD AUTO: 11.7 FL (ref 7.4–10.4)
POTASSIUM SERPL-SCNC: 3.8 MMOL/L (ref 3.4–5)
RBC # BLD AUTO: 4.45 M/MM3 (ref 4.1–5.3)
SODIUM SERPL-SCNC: 141 MMOL/L (ref 137–145)

## 2023-02-07 ENCOUNTER — HOSPITAL ENCOUNTER (OUTPATIENT)
Dept: HOSPITAL 19 - MEDICAL | Age: 78
Setting detail: OBSERVATION
LOS: 2 days | Discharge: HOME | End: 2023-02-09
Attending: HOSPITALIST | Admitting: HOSPITALIST
Payer: MEDICARE

## 2023-02-07 VITALS — TEMPERATURE: 97.9 F | SYSTOLIC BLOOD PRESSURE: 99 MMHG | HEART RATE: 64 BPM | DIASTOLIC BLOOD PRESSURE: 45 MMHG

## 2023-02-07 VITALS — HEART RATE: 56 BPM | DIASTOLIC BLOOD PRESSURE: 51 MMHG | TEMPERATURE: 97.5 F | SYSTOLIC BLOOD PRESSURE: 149 MMHG

## 2023-02-07 VITALS — HEIGHT: 55 IN | WEIGHT: 194.01 LBS | BODY MASS INDEX: 44.9 KG/M2

## 2023-02-07 DIAGNOSIS — R55: ICD-10-CM

## 2023-02-07 DIAGNOSIS — Z95.5: ICD-10-CM

## 2023-02-07 DIAGNOSIS — Z28.9: ICD-10-CM

## 2023-02-07 DIAGNOSIS — Z87.891: ICD-10-CM

## 2023-02-07 DIAGNOSIS — H42: ICD-10-CM

## 2023-02-07 DIAGNOSIS — Z79.84: ICD-10-CM

## 2023-02-07 DIAGNOSIS — Z79.899: ICD-10-CM

## 2023-02-07 DIAGNOSIS — I25.10: ICD-10-CM

## 2023-02-07 DIAGNOSIS — R41.82: Primary | ICD-10-CM

## 2023-02-07 DIAGNOSIS — U07.1: ICD-10-CM

## 2023-02-07 DIAGNOSIS — Z79.02: ICD-10-CM

## 2023-02-07 DIAGNOSIS — Z79.82: ICD-10-CM

## 2023-02-07 DIAGNOSIS — R93.0: ICD-10-CM

## 2023-02-07 DIAGNOSIS — N39.0: ICD-10-CM

## 2023-02-07 DIAGNOSIS — E07.89: ICD-10-CM

## 2023-02-07 DIAGNOSIS — B95.7: ICD-10-CM

## 2023-02-07 DIAGNOSIS — I35.1: ICD-10-CM

## 2023-02-07 DIAGNOSIS — Z28.310: ICD-10-CM

## 2023-02-07 DIAGNOSIS — Z91.199: ICD-10-CM

## 2023-02-07 DIAGNOSIS — I11.9: ICD-10-CM

## 2023-02-07 DIAGNOSIS — R79.89: ICD-10-CM

## 2023-02-07 DIAGNOSIS — G47.33: ICD-10-CM

## 2023-02-07 DIAGNOSIS — Z79.4: ICD-10-CM

## 2023-02-07 DIAGNOSIS — I65.23: ICD-10-CM

## 2023-02-07 DIAGNOSIS — E11.39: ICD-10-CM

## 2023-02-07 LAB
ALBUMIN SERPL-MCNC: 2.9 GM/DL (ref 3.4–4.8)
ALP SERPL-CCNC: 62 U/L (ref 40–150)
ALT SERPL-CCNC: 15 U/L (ref 0–55)
ANION GAP SERPL CALC-SCNC: 9 MMOL/L (ref 7–16)
AST SERPL-CCNC: 11 U/L (ref 5–34)
BASOPHILS # BLD: 0 K/MM3 (ref 0–0.2)
BASOPHILS NFR BLD AUTO: 0.3 % (ref 0–2)
BILIRUB SERPL-MCNC: 0.3 MG/DL (ref 0.2–1.2)
BUN SERPL-MCNC: 19 MG/DL (ref 10–20)
CALCIUM SERPL-MCNC: 8.1 MG/DL (ref 8.4–10.2)
CHLORIDE SERPL-SCNC: 111 MMOL/L (ref 98–107)
CO2 SERPL-SCNC: 23 MMOL/L (ref 23–31)
CREAT SERPL-SCNC: 0.98 MG/DL (ref 0.57–1.11)
EOSINOPHIL # BLD: 0.2 K/MM3 (ref 0–0.7)
EOSINOPHIL NFR BLD: 3.3 % (ref 0–4)
ERYTHROCYTE [DISTWIDTH] IN BLOOD BY AUTOMATED COUNT: 14.9 % (ref 11.5–14.5)
GLUCOSE SERPL-MCNC: 247 MG/DL (ref 70–99)
GRANULOCYTES # BLD AUTO: 61.8 % (ref 42.2–75.2)
HCT VFR BLD AUTO: 33.9 % (ref 37–47)
HGB BLD-MCNC: 10.3 G/DL (ref 12.5–16)
LYMPHOCYTES # BLD: 1.7 K/MM3 (ref 1.2–3.4)
LYMPHOCYTES NFR BLD: 26.1 % (ref 20–51)
MCH RBC QN AUTO: 25 PG (ref 27–31)
MCHC RBC AUTO-ENTMCNC: 30 G/DL (ref 33–37)
MCV RBC AUTO: 83 FL (ref 80–100)
MONOCYTES # BLD: 0.5 K/MM3 (ref 0.1–0.6)
MONOCYTES NFR BLD AUTO: 8.2 % (ref 1.7–9.3)
NEUTROPHILS # BLD: 4 K/MM3 (ref 1.4–6.5)
PLATELET # BLD AUTO: 231 K/MM3 (ref 130–400)
PMV BLD AUTO: 11.9 FL (ref 7.4–10.4)
POTASSIUM SERPL-SCNC: 3.7 MMOL/L (ref 3.5–4.5)
PROT SERPL-MCNC: 6 GM/DL (ref 6.2–8.1)
RBC # BLD AUTO: 4.07 M/MM3 (ref 4.1–5.3)
SODIUM SERPL-SCNC: 143 MMOL/L (ref 136–145)

## 2023-02-07 PROCEDURE — A9575 INJ GADOTERATE MEGLUMI 0.1ML: HCPCS

## 2023-02-07 PROCEDURE — G0379 DIRECT REFER HOSPITAL OBSERV: HCPCS

## 2023-02-07 PROCEDURE — A9270 NON-COVERED ITEM OR SERVICE: HCPCS

## 2023-02-07 PROCEDURE — G0378 HOSPITAL OBSERVATION PER HR: HCPCS

## 2023-02-07 NOTE — NUR
Initial shift assessment. Tele on patient, VSS, alert/oriented x4, Up to
bathroom with standby assist, steady on feet- denies pain,  called by House
supervisor that patient is covid + and needs to be moved to isolation room-

## 2023-02-07 NOTE — NUR
PT IN BED, ALERT & ORIENTED. ASSISTED TO BATHROOM, GAIT STEADY BUT A LITTLE
WEAK. BLOOD GLUCOSE 128. DINNER ORDERED, SNACK/WATER GIVEN. ADMISSION
ASSESSMENT COMPLETE. PT AND FAMILY MEMBER ORIENTED TO ROOM. NO COMPLAINTS
OTHER THAN BEING HUNGRY AND TIRED.

## 2023-02-08 VITALS — DIASTOLIC BLOOD PRESSURE: 65 MMHG | TEMPERATURE: 98.2 F | SYSTOLIC BLOOD PRESSURE: 157 MMHG | HEART RATE: 58 BPM

## 2023-02-08 VITALS — SYSTOLIC BLOOD PRESSURE: 164 MMHG | HEART RATE: 69 BPM | TEMPERATURE: 97.9 F | DIASTOLIC BLOOD PRESSURE: 90 MMHG

## 2023-02-08 VITALS — HEART RATE: 58 BPM | SYSTOLIC BLOOD PRESSURE: 143 MMHG | TEMPERATURE: 97.8 F | DIASTOLIC BLOOD PRESSURE: 54 MMHG

## 2023-02-08 VITALS — SYSTOLIC BLOOD PRESSURE: 110 MMHG | HEART RATE: 61 BPM | DIASTOLIC BLOOD PRESSURE: 74 MMHG | TEMPERATURE: 97.4 F

## 2023-02-08 VITALS — DIASTOLIC BLOOD PRESSURE: 61 MMHG | HEART RATE: 55 BPM | SYSTOLIC BLOOD PRESSURE: 144 MMHG | TEMPERATURE: 98.1 F

## 2023-02-08 VITALS — TEMPERATURE: 97.9 F | SYSTOLIC BLOOD PRESSURE: 137 MMHG | DIASTOLIC BLOOD PRESSURE: 50 MMHG | HEART RATE: 58 BPM

## 2023-02-08 VITALS — HEART RATE: 57 BPM | DIASTOLIC BLOOD PRESSURE: 62 MMHG | SYSTOLIC BLOOD PRESSURE: 155 MMHG

## 2023-02-08 LAB
ANION GAP SERPL CALC-SCNC: 8 MMOL/L (ref 7–16)
BASOPHILS # BLD: 0 K/MM3 (ref 0–0.2)
BASOPHILS NFR BLD AUTO: 0.3 % (ref 0–2)
BUN SERPL-MCNC: 15 MG/DL (ref 10–20)
CALCIUM SERPL-MCNC: 8.4 MG/DL (ref 8.4–10.2)
CHLORIDE SERPL-SCNC: 110 MMOL/L (ref 98–107)
CHOLEST SPEC-SCNC: 208 MG/DL (ref 0–199)
CHOLEST/HDLC SERPL-SRTO: 5.2
CO2 SERPL-SCNC: 25 MMOL/L (ref 23–31)
CREAT SERPL-SCNC: 0.98 MG/DL (ref 0.57–1.11)
EOSINOPHIL # BLD: 0.3 K/MM3 (ref 0–0.7)
EOSINOPHIL NFR BLD: 4.6 % (ref 0–4)
ERYTHROCYTE [DISTWIDTH] IN BLOOD BY AUTOMATED COUNT: 14.9 % (ref 11.5–14.5)
GLUCOSE SERPL-MCNC: 134 MG/DL (ref 70–99)
GRANULOCYTES # BLD AUTO: 62.5 % (ref 42.2–75.2)
HCT VFR BLD AUTO: 36 % (ref 37–47)
HDLC SERPL-MCNC: 40 MG/DL (ref 40–60)
HGB BLD-MCNC: 10.9 G/DL (ref 12.5–16)
LDLC SERPL-MCNC: 150 MG/DL
LYMPHOCYTES # BLD: 1.7 K/MM3 (ref 1.2–3.4)
LYMPHOCYTES NFR BLD: 24.3 % (ref 20–51)
MCH RBC QN AUTO: 25 PG (ref 27–31)
MCHC RBC AUTO-ENTMCNC: 30 G/DL (ref 33–37)
MCV RBC AUTO: 83 FL (ref 80–100)
MONOCYTES # BLD: 0.6 K/MM3 (ref 0.1–0.6)
MONOCYTES NFR BLD AUTO: 8 % (ref 1.7–9.3)
NEUTROPHILS # BLD: 4.4 K/MM3 (ref 1.4–6.5)
PLATELET # BLD AUTO: 247 K/MM3 (ref 130–400)
PMV BLD AUTO: 11.9 FL (ref 7.4–10.4)
POTASSIUM SERPL-SCNC: 3.8 MMOL/L (ref 3.5–4.5)
RBC # BLD AUTO: 4.35 M/MM3 (ref 4.1–5.3)
SODIUM SERPL-SCNC: 143 MMOL/L (ref 136–145)
TRIGL SERPL-MCNC: 88 MG/DL (ref 0–149)

## 2023-02-08 NOTE — NUR
The PA notified ANGELICA that the hospitalist is recommending a CPAP for the patient
and they inquired how to go about ordering one. The patient had one in the
past, but no longer has one. ANGELICA contacted Piero at [facility] to ask about this.
Piero reports that Medicare will only pay for 1 CPAP, every five years. He
checked the Medicare system and the patient is elgible for one. He states that
they would need the patient's diagnostic sleep studies, a script that includes
CPAP settings, and recent notes that document the patient needs one. ANGELICA
updated the PA.
 
The patient's COVID results at [facility] came back positive. The patient was
transferred to an isolation room. ANGELICA contacted the patient to discuss
discharge plan. The patient lives alone in [place]. She states that she
has good friend support in the area. She reports independence with ADLs and
does not have any DME. She receives primary care at [facility] and sees a
female provider, but could not recall her name. She also receives her
medications from [facility]. The patient does not have a DPOA-HC, but she was
interested in obtaining a form, when she goes home. ANGELICA placed a form on the
patient's chart. The patient shares that she is  and has five children:
Krishna Dela Cruz (ph#[phone], [place]), Abbey, Juan M, Imelda, and Jesus. They all
live out of state. ANGELICA informed the patient how all her children would be her
legal next of kin. The patient states that she would not want them to be her
decision makers, but she is still trying to decide who she wants to designate
as her DPOA-HC. The patient plans to return home upon discharge. ANGELICA then
addressed the CPAP. She is agreeable to getting it from [facility]. She then passed
the phone to her friend, Capri (ph#[phone]). Lewis states that her and
another friend, Jennifer, keep an eye out on the patient and help her. They
report everything back to the patient's children. She thanks ANGELICA for helping
the patient and states that her or another friend will transport the patient
home.
 
*Discharge plan: home with friend support. Will need to get CPAP set up*

## 2023-02-08 NOTE — NUR
Patient assessed around 2050. Denies having pain and discomfort. Alert and
oriented x 4. Voices no questions, needs, or concerns at this time. In bed
with call light within reach. Bed alarm on.

## 2023-02-08 NOTE — NUR
Now in room 303- droplet/contact isolation, Up to bathroom- steady on feet,
requesting some hot tea,, understands to call for bathroom- using call light
appropriately.

## 2023-02-09 VITALS — TEMPERATURE: 98 F | DIASTOLIC BLOOD PRESSURE: 62 MMHG | SYSTOLIC BLOOD PRESSURE: 131 MMHG | HEART RATE: 68 BPM

## 2023-02-09 VITALS — TEMPERATURE: 98.3 F | HEART RATE: 59 BPM | DIASTOLIC BLOOD PRESSURE: 44 MMHG | SYSTOLIC BLOOD PRESSURE: 134 MMHG

## 2023-02-09 VITALS — DIASTOLIC BLOOD PRESSURE: 53 MMHG | TEMPERATURE: 98.2 F | SYSTOLIC BLOOD PRESSURE: 93 MMHG | HEART RATE: 60 BPM

## 2023-02-09 LAB
ANION GAP SERPL CALC-SCNC: 5 MMOL/L (ref 7–16)
BASOPHILS # BLD: 0 K/MM3 (ref 0–0.2)
BASOPHILS NFR BLD AUTO: 0.2 % (ref 0–2)
BUN SERPL-MCNC: 17 MG/DL (ref 10–20)
CALCIUM SERPL-MCNC: 8.7 MG/DL (ref 8.4–10.2)
CHLORIDE SERPL-SCNC: 111 MMOL/L (ref 98–107)
CO2 SERPL-SCNC: 25 MMOL/L (ref 23–31)
CREAT SERPL-SCNC: 1.14 MG/DL (ref 0.57–1.11)
EOSINOPHIL # BLD: 0.3 K/MM3 (ref 0–0.7)
EOSINOPHIL NFR BLD: 5.9 % (ref 0–4)
ERYTHROCYTE [DISTWIDTH] IN BLOOD BY AUTOMATED COUNT: 14.9 % (ref 11.5–14.5)
GLUCOSE SERPL-MCNC: 110 MG/DL (ref 70–99)
GRANULOCYTES # BLD AUTO: 51.6 % (ref 42.2–75.2)
HCT VFR BLD AUTO: 35.4 % (ref 37–47)
HGB BLD-MCNC: 10.7 G/DL (ref 12.5–16)
LYMPHOCYTES # BLD: 1.8 K/MM3 (ref 1.2–3.4)
LYMPHOCYTES NFR BLD: 33.5 % (ref 20–51)
MCH RBC QN AUTO: 25 PG (ref 27–31)
MCHC RBC AUTO-ENTMCNC: 30 G/DL (ref 33–37)
MCV RBC AUTO: 83 FL (ref 80–100)
MONOCYTES # BLD: 0.5 K/MM3 (ref 0.1–0.6)
MONOCYTES NFR BLD AUTO: 8.6 % (ref 1.7–9.3)
NEUTROPHILS # BLD: 2.7 K/MM3 (ref 1.4–6.5)
PLATELET # BLD AUTO: 244 K/MM3 (ref 130–400)
PMV BLD AUTO: 12 FL (ref 7.4–10.4)
POTASSIUM SERPL-SCNC: 3.8 MMOL/L (ref 3.5–4.5)
RBC # BLD AUTO: 4.25 M/MM3 (ref 4.1–5.3)
SODIUM SERPL-SCNC: 141 MMOL/L (ref 136–145)

## 2023-02-09 NOTE — NUR
EEG  will have to be ordered as outpatient due to cardiopulmonary diagnostics
being closed for the week and next week. RN notified.

## 2023-02-09 NOTE — NUR
ANGELICA contacted Diya,  at the [facility], to inquire if they would
have the patient's sleep study on file. Diya reports that they do and she
emailed the study to this SW.
 
ANGELICA contacted and faxed the CPAP order to Otoniel at [facility]. Awaiting set up.

## 2023-02-09 NOTE — NUR
PT DISCHARGED TO HOME WITH ALL BELONGINGS. TAKEN HOME BY FRIENDS/CARETAKERS.
PT AND FRIENDS EDUCATED ON DISCHARGE INSTRUCTIONS AND NEW MEDICATIONS.
NOTIFIED THAT ANBTIBIOTIC IS BEING DISPENSED AT PREFERRED PHARMACY. PT AND
FRIENDS VERBALIZED UNDERSTANDING. PT IS ALERT AND ORIENTED AT TIME OF
DISCHARGE.

## 2023-02-09 NOTE — NUR
Patient has been alert and oriented this shift. Awakens easily. Voices no
questions, needs, or concerns at this time. In bed with call light within
reach.

## 2023-02-09 NOTE — NUR
Piero, at [facility], reports that the patient's sleep study does not qualify her
for a CPAP, so they would not be able to get it set up. The patient has to
have a RDI of 5 or above. He states that she would need to have a new sleep
study to see if she would qualify now. SW updated the PA.
 
SW met with the patient to update on the above. The patient verbalized
understanding. SW also discussed home health services. The patient states that
she may be moving into [facility] [place], a [facility] [place] in [place],
so she may not need any home health. She declined home health at this time. SW
informed her that if she changes her mind, her PCP can help get this set up.
The patient verbalized understanding.
 
The patient is to discharge back home today, 2/9. No additional needs at this
time.